# Patient Record
(demographics unavailable — no encounter records)

---

## 2024-11-05 NOTE — PHYSICAL EXAM
[Chaperone Present] : A chaperone was present in the examining room during all aspects of the physical examination [09525] : A chaperone was present during the pelvic exam. [Appropriately responsive] : appropriately responsive [Alert] : alert [No Acute Distress] : no acute distress [Soft] : soft [Non-tender] : non-tender [Non-distended] : non-distended [No HSM] : No HSM [No Lesions] : no lesions [No Mass] : no mass [Oriented x3] : oriented x3 [Examination Of The Breasts] : a normal appearance [No Masses] : no breast masses were palpable [Labia Majora] : normal [Labia Minora] : normal [Normal] : normal [Uterine Adnexae] : normal [FreeTextEntry2] : Kenna [Enlargement Of The Left Breast] : swelling

## 2024-11-05 NOTE — PLAN
[FreeTextEntry1] : 36 y/o female presenting for annual exam: -f/u pap and GC/CT -Contraception: OCP's. Refill for Jasmina sent to pharmacy -f/u PRN

## 2024-11-05 NOTE — HISTORY OF PRESENT ILLNESS
[Y] : Positive pregnancy history [No] : Patient does not have concerns regarding sex [Currently Active] : currently active [Men] : men [Vaginal] : vaginal [FreeTextEntry1] : Patient is a 38 y/o presenting for an annual visit. She is feeling well and is without complaints. She denies vaginal itching, odor and discharge. Denies urinary urgency, frequency and dysuria. She is s/p  on 24. She reports that she is still breast feeding and has not had a menses since prior to delivery. Her left breast is currently engorged. [PGHxTotal] : 3 [[facility]] : 1 [PGHxPremature] : 0 [PGHxAbortions] : 2 [[facility] Centeriving] : 1 [PGHxABInduced] : 0 [PGHxABSpont] : 0 [PGHxEctopic] : 0 [PGHxMultBirths] : 0 [FreeTextEntry3] : Jasmina